# Patient Record
Sex: MALE
[De-identification: names, ages, dates, MRNs, and addresses within clinical notes are randomized per-mention and may not be internally consistent; named-entity substitution may affect disease eponyms.]

---

## 2020-02-24 ENCOUNTER — HOSPITAL ENCOUNTER (EMERGENCY)
Dept: HOSPITAL 75 - ER FS | Age: 30
Discharge: HOME | End: 2020-02-24
Payer: COMMERCIAL

## 2020-02-24 VITALS — BODY MASS INDEX: 25.17 KG/M2 | WEIGHT: 194.01 LBS | HEIGHT: 73.62 IN

## 2020-02-24 VITALS — DIASTOLIC BLOOD PRESSURE: 77 MMHG | SYSTOLIC BLOOD PRESSURE: 127 MMHG

## 2020-02-24 DIAGNOSIS — S46.912A: ICD-10-CM
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
## 2020-02-24 DIAGNOSIS — S16.1XXA: ICD-10-CM

## 2020-02-24 DIAGNOSIS — Z87.891: ICD-10-CM
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
## 2020-02-24 DIAGNOSIS — S83.92XA: Primary | ICD-10-CM

## 2020-02-24 PROCEDURE — 72125 CT NECK SPINE W/O DYE: CPT

## 2020-02-24 PROCEDURE — 73020 X-RAY EXAM OF SHOULDER: CPT

## 2020-02-24 PROCEDURE — 73562 X-RAY EXAM OF KNEE 3: CPT
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
